# Patient Record
Sex: MALE | NOT HISPANIC OR LATINO | URBAN - METROPOLITAN AREA
[De-identification: names, ages, dates, MRNs, and addresses within clinical notes are randomized per-mention and may not be internally consistent; named-entity substitution may affect disease eponyms.]

---

## 2018-06-01 ENCOUNTER — EMERGENCY (EMERGENCY)
Facility: HOSPITAL | Age: 73
LOS: 1 days | Discharge: HOME | End: 2018-06-01
Attending: EMERGENCY MEDICINE
Payer: MEDICARE

## 2018-06-01 VITALS
RESPIRATION RATE: 17 BRPM | OXYGEN SATURATION: 96 % | TEMPERATURE: 98 F | WEIGHT: 139.99 LBS | HEIGHT: 66.93 IN | SYSTOLIC BLOOD PRESSURE: 124 MMHG | DIASTOLIC BLOOD PRESSURE: 82 MMHG | HEART RATE: 92 BPM

## 2018-06-01 PROCEDURE — 99283 EMERGENCY DEPT VISIT LOW MDM: CPT

## 2018-06-01 RX ORDER — CARBAMAZEPINE 200 MG
0 TABLET ORAL
Qty: 0 | Refills: 0 | COMMUNITY

## 2018-06-01 RX ORDER — DULOXETINE HYDROCHLORIDE 30 MG/1
0 CAPSULE, DELAYED RELEASE ORAL
Qty: 0 | Refills: 0 | COMMUNITY

## 2018-06-01 RX ORDER — LOSARTAN POTASSIUM 100 MG/1
0 TABLET, FILM COATED ORAL
Qty: 0 | Refills: 0 | COMMUNITY

## 2018-06-01 RX ORDER — OXYCODONE HYDROCHLORIDE 5 MG/1
5 TABLET ORAL ONCE
Qty: 0 | Refills: 0 | Status: DISCONTINUED | OUTPATIENT
Start: 2018-06-01 | End: 2018-06-01

## 2018-06-01 RX ORDER — SIMVASTATIN 20 MG/1
0 TABLET, FILM COATED ORAL
Qty: 0 | Refills: 0 | COMMUNITY

## 2018-06-01 RX ORDER — ACETAMINOPHEN 500 MG
650 TABLET ORAL ONCE
Qty: 0 | Refills: 0 | Status: COMPLETED | OUTPATIENT
Start: 2018-06-01 | End: 2018-06-01

## 2018-06-01 RX ORDER — LINAGLIPTIN 5 MG/1
0 TABLET, FILM COATED ORAL
Qty: 0 | Refills: 0 | COMMUNITY

## 2018-06-01 RX ORDER — OXYCODONE HYDROCHLORIDE 5 MG/1
1 TABLET ORAL
Qty: 20 | Refills: 0 | OUTPATIENT
Start: 2018-06-01 | End: 2018-06-05

## 2018-06-01 RX ADMIN — Medication 650 MILLIGRAM(S): at 17:29

## 2018-06-01 RX ADMIN — OXYCODONE HYDROCHLORIDE 5 MILLIGRAM(S): 5 TABLET ORAL at 17:29

## 2018-06-01 NOTE — ED PROVIDER NOTE - CHIEF COMPLAINT
The patient is a 73y Male complaining of The patient is a 73y Male complaining of acute exacerbation of chronic neuropathic facial pain.

## 2018-06-01 NOTE — ED PROVIDER NOTE - MEDICAL DECISION MAKING DETAILS
**ATTENDING MEDICAL DECISION MAKING/SYNTHESIS (Dr. Eduin Vargas): I have reviewed the Chief Complaint, the HPI, the ROS, and have directly performed and confirmed the findings on the Physical Examination. I have reviewed the medical decision making with all providers, as applicable. The PROBLEM REPRESENTATION at this time is: 73-year-old man with history of Diabetes Mellitus, Hypertension, hyperlipidemia, and trigeminal neuralgia (already on Tegretol) now presenting with progressively worsening left-sided facial pain over the past several days. With known history of trigeminal neuralgia for over 10 years (s/p dental implant). The MOST LIKELY DIAGNOSIS, and the LIST OF DIFFERENTIAL DIAGNOSES, includes (but is not limited to) the following: exacerbation/worsening of trigeminal neuralgia (likely), soft-tissue infection (NO evidence), serious bacterial infection or sepsis/severe sepsis (NO evidence), dental infection or equivalent inflammation (NO evidence), dehydration, electrolyte-metabolic-endocrine derangements, mass/tumor (NO evidence), trauma (NO evidence), chronic pain syndrome (obvious). The likelihood of each of these diagnoses has been appropriately considered in the context of this patient's presentation and my evaluation. PLAN: as described in EMR, including diagnostics, therapeutics and consultation as clinically warranted. I will continue to reevaluate the patient, including the results of all testing, and monitor response to therapy throughout the patient's course in the ED.

## 2018-06-01 NOTE — ED PROVIDER NOTE - ATTENDING CONTRIBUTION TO CARE
**ATTENDING ADDENDUM (Dr. Eduin Vargas): I attest that I have directly examined this patient and reviewed and formulated the diagnostic and therapeutic management plan in collaboration with the advanced practitioner (NP, PA). Please see MDM note and remainder of EMR for findings from CC, HPI, ROS, and PE. ([place])

## 2018-06-01 NOTE — ED PROVIDER NOTE - OBJECTIVE STATEMENT
72yo male pt with PMHx of DM, HTN, HLD, Trigeminal Neuralgia (on Tegretol) c/o worsening left facial nerve pain today. Pt stated he has been left facial pain for 10years after the procedure for tooth implant and on f/u with neurologist Dr. Saucedo. The pain's worsen since several days ago and his family wanted to find a new neurologist for pain control. Pt also reported he's seen by another neurologist Dr. Weiss and he was referred to a specialist in [facility] but he had no chance to f/u with the specialist. Denies fever, chills or cough. Denies facial swelling. Denies eye pain or visual changes. Denies difficult swallowing or throat discomfort. Denies sensory changes or weakness to extremities. Denies CP/SOB/ABD pain or N/V/D. 74yo male pt with PMHx of DM, HTN, HLD, Trigeminal Neuralgia (on Tegretol) c/o worsening left facial nerve pain today. Pt stated he has been left facial pain for 10 years after the procedure for tooth implant and on f/u with neurologist Dr. Saucedo. The pain's worsen since several days ago and his family wanted to find a new neurologist for pain control. Pt also reported he's seen by another neurologist Dr. Weiss and he was referred to a specialist in [facility] but he had no chance to f/u with the specialist. Denies fever, chills or cough. Denies facial swelling. Denies eye pain or visual changes. Denies difficult swallowing or throat discomfort. Denies sensory changes or weakness to extremities. Denies CP/SOB/ABD pain or N/V/D.  **ATTENDING ADDENDUM (Dr. Eduin Vargas): I attest that I have directly examined this patient and elicited a comparable history of present illness and review of systems with my collaborating provider (NP/PA). I attest that I have made significant contributions to the documentation where necessary and as noted in the EMR. Of note, and in addition to the above, there is NO reported fevers, chills, soft-tissue swelling, nausea, vomiting, angioedema, rashes, lesions (e.g. vesicles), new numbness, tingling, weakness, or paresthesias. NO movement-associated, pleuritic, reproducible, positional, or exertional component to the symptoms. NO new medications. VAS 2-3/10.

## 2018-06-01 NOTE — ED ADULT TRIAGE NOTE - CHIEF COMPLAINT QUOTE
has left sided tooth pain, saw a dentist prior to coming to ED and told them it was a nerve and to see a neurologist

## 2018-06-01 NOTE — ED PROVIDER NOTE - CARE PLAN
Principal Discharge DX:	Trigeminal neuralgia syndrome  Goal:	ATTENDING ADDENDUM (Dr. Eduin Vargas): Goals of care include resolution of emergent/urgent symptoms and concerns, and restoration to baseline level of homeostasis. Principal Discharge DX:	Trigeminal neuralgia syndrome  Goal:	ATTENDING ADDENDUM (Dr. Eduin Vargas): Goals of care include resolution of emergent/urgent symptoms and concerns, and restoration to baseline level of homeostasis.  Assessment and plan of treatment:	ATTENDING ADDENDUM (Dr. Eduin Vargas): (1) anticipatory guidance provided  (2) rest  (3) outpatient follow-up with your primary care physician/provider (4) return if symptoms worsen, persist, or do not resolve (5) medications, if indicated, as prescribed (6) referral to neurologist already provided and arranged by primary care physician/provider.

## 2018-06-01 NOTE — ED PROVIDER NOTE - PLAN OF CARE
ATTENDING ADDENDUM (Dr. Eduin Vargas): Goals of care include resolution of emergent/urgent symptoms and concerns, and restoration to baseline level of homeostasis. ATTENDING ADDENDUM (Dr. Eduin Vargas): (1) anticipatory guidance provided  (2) rest  (3) outpatient follow-up with your primary care physician/provider (4) return if symptoms worsen, persist, or do not resolve (5) medications, if indicated, as prescribed (6) referral to neurologist already provided and arranged by primary care physician/provider.

## 2018-06-01 NOTE — ED PROVIDER NOTE - PHYSICAL EXAMINATION
NAD, VSS, Afebrile, No facial swelling, + PERRL, Air way intact, + Left low gum s/p teeth extraction (17,18, 19) with mild tender, no swelling or eryth. + Left sided trigeminal nerve tender, No lymphadenopathy, Neuro- intact. NAD, VSS, Afebrile, No facial swelling, + PERRL, Air way intact, + Left low gum s/p teeth extraction (17,18, 19) with mild tender, no swelling or eryth. + Left sided trigeminal nerve tender, No lymphadenopathy, Neuro- intact.  **ATTENDING ADDENDUM (Dr. Eduin Vargas): I have reviewed and substantially contributed to the elements of the PE as documented above. I have directly performed an examination of this patient in conjunction with the other members (EM resident/PA/NP) of the patient care team.

## 2018-06-01 NOTE — ED PROVIDER NOTE - DIAGNOSIS COUNSELING, MDM
conducted a detailed discussion... REBEKAH Coyne had a detailed discussion with the patient and/or guardian regarding the historical points, exam findings, and any diagnostic results supporting the discharge/admit diagnosis. (Patient/family did not request, need, or want additional translation services at time of initial evaluation.)

## 2018-06-01 NOTE — ED PROVIDER NOTE - PROGRESS NOTE DETAILS
**ATTENDING ADDENDUM (Dr. Eduin Vargas): patient serially evaluated throughout ED course. NO acute deterioration up to this time in the ED. Patient evaluated. Agree with goals/plan of ED care as described in EMR, including therapeutics (analgesics, NSAIDS) and consultation (neurology) as clinically warranted. Discussed goals/plan with patient's family member; recommend referral to neurology as outpatient for subspecialty expertise in neuropathic pain/headache. Family member asked if neurology consultation available; considered, but do not believe this consultation will add to the therapeutic interventions (patient already on carbamazepine and off-label anti-depressant for chronic pain syndrome secondary to trigeminal neuralgia). REBEKAH Coyne thoroughly explained care goals/plan to patient and family in [place]ian. Anticipatory guidance provided. Of note, and in addition to the above, NO evidence of dental abscess, soft-tissue infection or other worrisome condition at this time.

## 2018-06-01 NOTE — ED ADULT NURSE NOTE - OBJECTIVE STATEMENT
Pt bib daughter for eval of pain left upper gum area for which he has already seen a dentist.  Denies trauma or any recent dental work.  No fever. Pain worsens with chewing or cold fluids.

## 2018-06-04 PROBLEM — E78.00 PURE HYPERCHOLESTEROLEMIA, UNSPECIFIED: Chronic | Status: ACTIVE | Noted: 2018-06-01

## 2018-06-04 PROBLEM — I10 ESSENTIAL (PRIMARY) HYPERTENSION: Chronic | Status: ACTIVE | Noted: 2018-06-01

## 2018-06-04 PROBLEM — E11.9 TYPE 2 DIABETES MELLITUS WITHOUT COMPLICATIONS: Chronic | Status: ACTIVE | Noted: 2018-06-01

## 2018-06-04 PROBLEM — Z00.00 ENCOUNTER FOR PREVENTIVE HEALTH EXAMINATION: Status: ACTIVE | Noted: 2018-06-04

## 2018-06-11 ENCOUNTER — APPOINTMENT (OUTPATIENT)
Dept: PAIN MANAGEMENT | Facility: CLINIC | Age: 73
End: 2018-06-11